# Patient Record
Sex: FEMALE | Race: WHITE | Employment: UNEMPLOYED | URBAN - METROPOLITAN AREA
[De-identification: names, ages, dates, MRNs, and addresses within clinical notes are randomized per-mention and may not be internally consistent; named-entity substitution may affect disease eponyms.]

---

## 2018-05-01 ENCOUNTER — OFFICE VISIT (OUTPATIENT)
Dept: FAMILY MEDICINE CLINIC | Age: 18
End: 2018-05-01

## 2018-05-01 ENCOUNTER — TELEPHONE (OUTPATIENT)
Dept: FAMILY MEDICINE CLINIC | Age: 18
End: 2018-05-01

## 2018-05-01 VITALS
BODY MASS INDEX: 24.62 KG/M2 | HEIGHT: 70 IN | SYSTOLIC BLOOD PRESSURE: 116 MMHG | WEIGHT: 172 LBS | RESPIRATION RATE: 17 BRPM | TEMPERATURE: 98.1 F | OXYGEN SATURATION: 98 % | DIASTOLIC BLOOD PRESSURE: 80 MMHG | HEART RATE: 76 BPM

## 2018-05-01 DIAGNOSIS — M76.892 TENDINITIS OF LEFT KNEE: ICD-10-CM

## 2018-05-01 DIAGNOSIS — M25.562 ACUTE PAIN OF LEFT KNEE: Primary | ICD-10-CM

## 2018-05-01 PROBLEM — N92.0 MENORRHAGIA: Status: ACTIVE | Noted: 2018-05-01

## 2018-05-01 PROBLEM — L70.0 ACNE VULGARIS: Status: ACTIVE | Noted: 2018-05-01

## 2018-05-01 PROBLEM — N91.5 OLIGOMENORRHEA: Status: ACTIVE | Noted: 2018-05-01

## 2018-05-01 RX ORDER — NORETHINDRONE ACETATE AND ETHINYL ESTRADIOL 1; .02 MG/1; MG/1
TABLET ORAL
COMMUNITY

## 2018-05-01 RX ORDER — CLINDAMYCIN PHOSPHATE, BENZOYL PEROXIDE 25; 10 MG/G; MG/G
GEL TOPICAL
COMMUNITY

## 2018-05-01 RX ORDER — CLINDAMYCIN AND BENZOYL PEROXIDE 10; 50 MG/G; MG/G
GEL TOPICAL
COMMUNITY
Start: 2018-02-22

## 2018-05-01 NOTE — TELEPHONE ENCOUNTER
----- Message from Penelope Christy NP sent at 5/1/2018  3:30 PM EDT -----  Please call the patient regarding her result. Nothing identified on image so will continue with referral to sports medicine for continued care.

## 2018-05-01 NOTE — PROGRESS NOTES
Gloria Squires is a 21 y.o. female (: 2000) presenting to address:Knee pain bilateral     Chief Complaint   Patient presents with    Knee Pain     Left sided; x1 year        Vitals:    18 1135   BP: 116/80   Pulse: 76   Resp: 17   Temp: 98.1 °F (36.7 °C)   TempSrc: Oral   SpO2: 98%   Weight: 172 lb (78 kg)   Height: 5' 10\" (1.778 m)   PainSc:   0 - No pain   LMP: 2018       Hearing/Vision:   No exam data present    Learning Assessment:     Learning Assessment 2018   PRIMARY LEARNER Patient   HIGHEST LEVEL OF EDUCATION - PRIMARY LEARNER  SOME COLLEGE   BARRIERS PRIMARY LEARNER NONE   CO-LEARNER CAREGIVER No   PRIMARY LANGUAGE ENGLISH   LEARNER PREFERENCE PRIMARY DEMONSTRATION   ANSWERED BY patient   RELATIONSHIP SELF     Depression Screening:     PHQ over the last two weeks 2018   Little interest or pleasure in doing things Not at all   Feeling down, depressed or hopeless Not at all   Total Score PHQ 2 0     Fall Risk Assessment:   No flowsheet data found. Abuse Screening:     Abuse Screening Questionnaire 2018   Do you ever feel afraid of your partner? N   Are you in a relationship with someone who physically or mentally threatens you? N   Is it safe for you to go home? Y     Coordination of Care Questionaire:   1. Have you been to the ER, urgent care clinic since your last visit? Hospitalized since your last visit? no    2. Have you seen or consulted any other health care providers outside of the [address] [place] [address] since your last visit? Include any pap smears or colon screening. Dr. Monae Lazo    Advanced Directive:   1. Do you have an Advanced Directive? no    2. Would you like information on Advanced Directives?  no

## 2018-05-01 NOTE — PATIENT INSTRUCTIONS
Knee Pain or Injury: Care Instructions  Your Care Instructions    Injuries are a common cause of knee problems. Sudden (acute) injuries may be caused by a direct blow to the knee. They can also be caused by abnormal twisting, bending, or falling on the knee. Pain, bruising, or swelling may be severe, and may start within minutes of the injury. Overuse is another cause of knee pain. Other causes are climbing stairs, kneeling, and other activities that use the knee. Everyday wear and tear, especially as you get older, also can cause knee pain. Rest, along with home treatment, often relieves pain and allows your knee to heal. If you have a serious knee injury, you may need tests and treatment. Follow-up care is a key part of your treatment and safety. Be sure to make and go to all appointments, and call your doctor if you are having problems. It's also a good idea to know your test results and keep a list of the medicines you take. How can you care for yourself at home? · Be safe with medicines. Read and follow all instructions on the label. ¨ If the doctor gave you a prescription medicine for pain, take it as prescribed. ¨ If you are not taking a prescription pain medicine, ask your doctor if you can take an over-the-counter medicine. · Rest and protect your knee. Take a break from any activity that may cause pain. · Put ice or a cold pack on your knee for 10 to 20 minutes at a time. Put a thin cloth between the ice and your skin. · Prop up a sore knee on a pillow when you ice it or anytime you sit or lie down for the next 3 days. Try to keep it above the level of your heart. This will help reduce swelling. · If your knee is not swollen, you can put moist heat, a heating pad, or a warm cloth on your knee. · If your doctor recommends an elastic bandage, sleeve, or other type of support for your knee, wear it as directed.   · Follow your doctor's instructions about how much weight you can put on your leg. Use a cane, crutches, or a walker as instructed. · Follow your doctor's instructions about activity during your healing process. If you can do mild exercise, slowly increase your activity. · Reach and stay at a healthy weight. Extra weight can strain the joints, especially the knees and hips, and make the pain worse. Losing even a few pounds may help. When should you call for help? Call 911 anytime you think you may need emergency care. For example, call if:  ? · You have symptoms of a blood clot in your lung (called a pulmonary embolism). These may include:  ¨ Sudden chest pain. ¨ Trouble breathing. ¨ Coughing up blood. ?Call your doctor now or seek immediate medical care if:  ? · You have severe or increasing pain. ? · Your leg or foot turns cold or changes color. ? · You cannot stand or put weight on your knee. ? · Your knee looks twisted or bent out of shape. ? · You cannot move your knee. ? · You have signs of infection, such as:  ¨ Increased pain, swelling, warmth, or redness. ¨ Red streaks leading from the knee. ¨ Pus draining from a place on your knee. ¨ A fever. ? · You have signs of a blood clot in your leg (called a deep vein thrombosis), such as:  ¨ Pain in your calf, back of the knee, thigh, or groin. ¨ Redness and swelling in your leg or groin. ? Watch closely for changes in your health, and be sure to contact your doctor if:  ? · You have tingling, weakness, or numbness in your knee. ? · You have any new symptoms, such as swelling. ? · You have bruises from a knee injury that last longer than 2 weeks. ? · You do not get better as expected. Where can you learn more? Go to http://olga-ana.info/. Enter K195 in the search box to learn more about \"Knee Pain or Injury: Care Instructions. \"  Current as of: March 20, 2017  Content Version: 11.4  © 2599-4538 [employer].  Care instructions adapted under license by [employer] (which disclaims liability or warranty for this information). If you have questions about a medical condition or this instruction, always ask your healthcare professional. [address] any warranty or liability for your use of this information.

## 2018-05-03 NOTE — TELEPHONE ENCOUNTER
Spoke with patient, verified with 2 identifiers. Advised of below. Patient verbalized understanding.

## 2018-05-07 ENCOUNTER — OFFICE VISIT (OUTPATIENT)
Dept: FAMILY MEDICINE CLINIC | Age: 18
End: 2018-05-07

## 2018-05-07 VITALS
HEIGHT: 70 IN | TEMPERATURE: 98.1 F | HEART RATE: 61 BPM | BODY MASS INDEX: 24.62 KG/M2 | DIASTOLIC BLOOD PRESSURE: 73 MMHG | RESPIRATION RATE: 17 BRPM | SYSTOLIC BLOOD PRESSURE: 114 MMHG | WEIGHT: 172 LBS | OXYGEN SATURATION: 97 %

## 2018-05-07 DIAGNOSIS — G89.29 CHRONIC PAIN OF LEFT KNEE: Primary | ICD-10-CM

## 2018-05-07 DIAGNOSIS — M25.562 CHRONIC PAIN OF BOTH KNEES: ICD-10-CM

## 2018-05-07 DIAGNOSIS — M25.551 RIGHT HIP PAIN: ICD-10-CM

## 2018-05-07 DIAGNOSIS — M25.561 CHRONIC PAIN OF BOTH KNEES: ICD-10-CM

## 2018-05-07 DIAGNOSIS — G89.29 CHRONIC PAIN OF BOTH KNEES: ICD-10-CM

## 2018-05-07 DIAGNOSIS — M25.562 CHRONIC PAIN OF LEFT KNEE: Primary | ICD-10-CM

## 2018-05-07 NOTE — MR AVS SNAPSHOT
[address] [place] [address] [address] [facility] [id number] [phone] Patient: Carissa López MRN: [id number] :2000 Visit Information Date & Time Provider Department Dept. Phone Encounter #  
 2018  1:30 PM Elsie Agudelo, 3 [facility] [phone] [id number] Upcoming Health Maintenance Date Due Hepatitis A Peds Age 1-18 (1 of 2 - Standard Series) 11/10/1999 DTaP/Tdap/Td series (1 - Tdap) 11/10/2005 HPV Age 9Y-34Y (1 of 3 - Female 3 Dose Series) 11/10/2009 Influenza Age 5 to Adult 2018 Allergies as of 2018  Review Complete On: 2018 By: Elsie Agudelo MD  
  
 Severity Noted Reaction Type Reactions Nuts [Tree Nut] High 2018    Anaphylaxis Melon  2018    Itching Current Immunizations  Never Reviewed No immunizations on file. Not reviewed this visit You Were Diagnosed With   
  
 Codes Comments Chronic pain of left knee    -  Primary ICD-10-CM: M25.562, F07.11 ICD-9-CM: 719.46, 338.29 Vitals BP Pulse Temp Resp Height(growth percentile) Weight(growth percentile) 114/73 (55 %/ 75 %)* (BP 1 Location: Right arm, BP Patient Position: Sitting) 61 98.1 °F (36.7 °C) (Oral) 17 5' 10\" (1.778 m) (99 %, Z= 2.25) 172 lb (78 kg) (93 %, Z= 1.44) LMP SpO2 BMI OB Status Smoking Status 2018 97% 24.68 kg/m2 (77 %, Z= 0.75) Having regular periods Never Smoker *BP percentiles are based on NHBPEP's 4th Report Growth percentiles are based on CDC 2-20 Years data. BMI and BSA Data Body Mass Index Body Surface Area  
 24.68 kg/m 2 1.96 m 2 Preferred Pharmacy Pharmacy Name Phone [phone] [phone] Your Updated Medication List  
  
   
This list is accurate as of 18  2:15 PM.  Always use your most recent med list.  
  
  
  
  
 * ACANYA 1.2-2.5 % Glwp Generic drug:  clindamycin-benzoyl peroxide Acanya 1.2 %-2.5 % topical gel with pump * clindamycin-benzoyl peroxide 1.2-2.5 % Glwp APPLY A PEA SIZED AMOUNT TO ENTIRE FACE IN THE MORNING AS DIRECTED * clindamycin-benzoyl peroxide 1-5 % topical gel Commonly known as:  Janet Clarice 1/20 (21) 1-20 mg-mcg Tab Generic drug:  norethindrone-ethinyl estradiol Take  by mouth.  
  
 sulfacetamide sodium 10 % topical cream  
Apply  to affected area two (2) times a day. tazorotene 0.05 % topical gel Commonly known as:  Faina Mishraon Apply  to affected area nightly. use thin film * Notice: This list has 3 medication(s) that are the same as other medications prescribed for you. Read the directions carefully, and ask your doctor or other care provider to review them with you. We Performed the Following REFERRAL TO PHYSICAL THERAPY [BSE75 Custom] Comments:  
 Please eval and treat. [employer] volleyball athlete. Referral Information Referral ID Referred By Referred To  
  
 [id number] Bernardkevin Rehman [address] [place] [address] Phone: [phone] Fax: [phone] Visits Status Start Date End Date 1 New Request 5/7/18 5/7/19 If your referral has a status of pending review or denied, additional information will be sent to support the outcome of this decision. Patient Instructions To Do: 
· Hold off on starting conditioning until your PT eval.  They will be able to direct you a bit on what to focus on, without \"making this worse\". Notes from your doctor: · Be sure to give the physical therapists some feedback! Let them know: what your short term & long term goals are. .. If exercises seems too easy or too hard. ..  If the previous PT session left you more sore than usual, etc.  It is important for them to know these things in order to tailor your rehab plan. Introducing [facility]! Diana Holly introduces [employer] patient portal. Now you can access parts of your medical record, email your doctor's office, and request medication refills online. 1. In your internet browser, go to [email or web address] 2. Click on the First Time User? Click Here link in the Sign In box. You will see the New Member Sign Up page. 3. Enter your [employer] Access Code exactly as it appears below. You will not need to use this code after youve completed the sign-up process. If you do not sign up before the expiration date, you must request a new code. · [employer] Access Code: [email or web address] Expires: 8/5/2018  2:15 PM 
 
4. Enter the last four digits of your Social Security Number (xxxx) and Date of Birth (mm/dd/yyyy) as indicated and click Submit. You will be taken to the next sign-up page. 5. Create a [employer] ID. This will be your [employer] login ID and cannot be changed, so think of one that is secure and easy to remember. 6. Create a [employer] password. You can change your password at any time. 7. Enter your Password Reset Question and Answer. This can be used at a later time if you forget your password. 8. Enter your e-mail address. You will receive e-mail notification when new information is available in [address] 9. Click Sign Up. You can now view and download portions of your medical record. 10. Click the Download Summary menu link to download a portable copy of your medical information. If you have questions, please visit the Frequently Asked Questions section of the [employer] website. Remember, [employer] is NOT to be used for urgent needs. For medical emergencies, dial 911. Now available from your iPhone and Android! Please provide this summary of care documentation to your next provider. If you have any questions after today's visit, please call [phone].

## 2018-05-07 NOTE — PROGRESS NOTES
3 [facility]  Sports Medicine Consultation Note    Sofia Shin is a 21 y.o. female (: 2000) presenting to obtain consultative services regarding:  Chief Complaint   Patient presents with    Knee Pain     Left sided x1 year        PCP: No primary care provider on file. Requesting provider: Barbara Us NP    Assessment/Plan:       ICD-10-CM ICD-9-CM   1. Chronic pain of left knee M25.562 719.46    G89.29 338.29     Reviewed radiographs with Sofia Shin. 2/2 patellofemoral pain syndrome (weak glutes & VMO, poor biomechanics, overuse). Start formal PT. Ice PRN. F/u PRN. Orders Placed This Encounter    [facility]     Referral Priority:   Routine     Referral Type:   PT/OT/ST     Referral Reason:   Specialty Services Required       Spent 25min face-to-face, >50% spent on counseling & patient education. Overdue HM items:   Ms. Truman Patton has a reminder for a \"due or due soon\" health maintenance. I have asked that she contact her primary care provider for follow-up on this health maintenance. Management plan & patient instructions discussed with Sofia Shin, who voiced understanding. Routed information to requesting provider in shared medical record. Thank you for the opportunity to participate in the care of this patient. If any questions or concerns at all, please feel free to contact me. This document may have been created with the aid of dictation software. Text may contain errors, particularly phonetic errors. Phoenix Islas MD  Internal Medicine, Family Medicine & Sports Medicine  2018, 2:01 PM    Patient Instructions (provided in AVS): To Do:  · Hold off on starting conditioning until your PT eval.  They will be able to direct you a bit on what to focus on, without \"making this worse\". Notes from your doctor:  · Be sure to give the physical therapists some feedback!   Let them know: what your short term & long term goals are. .. If exercises seems too easy or too hard. .. If the previous PT session left you more sore than usual, etc.  It is important for them to know these things in order to tailor your rehab plan. History:     I was asked to provide consultative services by Leobardo Vega NP for advice/opinion related to evaluating    Chief Complaint   Patient presents with    Knee Pain     Left sided x1 year        [employer]. 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 3yrs ago, it was right knee that was having \"issues\". Last year, now is left knee. Worse with weight bearing and twisting. Running isn't bad. Squats & box jumps can be troublesome when it is painful. No swelling.  + pseudoinstability, but no true instability.  + locking   Pain is anterior, and does not radiate. Past Medical History:   Diagnosis Date    Hives      Past Surgical History:   Procedure Laterality Date    HX TONSILLECTOMY        reports that she has never smoked. She has never used smokeless tobacco. She reports that she drinks alcohol. She reports that she does not use illicit drugs. Family History   Problem Relation Age of Onset    No Known Problems Mother     No Known Problems Father      Allergies   Allergen Reactions    Nuts [Tree Nut] Anaphylaxis    Melon Itching       Problem List:      Patient Active Problem List    Diagnosis    Menorrhagia    Oligomenorrhea    Acne vulgaris       Medications:     Current Outpatient Prescriptions on File Prior to Visit   Medication Sig Dispense Refill    clindamycin-benzoyl peroxide (ACANYA) 1.2-2.5 % glwp Acanya 1.2 %-2.5 % topical gel with pump      clindamycin-benzoyl peroxide 1.2-2.5 % glwp APPLY A PEA SIZED AMOUNT TO ENTIRE FACE IN THE MORNING AS DIRECTED      norethindrone-ethinyl estradiol (JUNEL 1/20, 21,) 1-20 mg-mcg tab Take  by mouth.  tazorotene (TAZORAC) 0.05 % topical gel Apply  to affected area nightly.  use thin film      sulfacetamide sodium 10 % topical cream Apply  to affected area two (2) times a day.  clindamycin-benzoyl peroxide (BENZACLIN) 1-5 % topical gel        No current facility-administered medications on file prior to visit. Review of Systems:     Review of Systems   Musculoskeletal: Positive for joint pain. Skin: Negative for rash. Neurological: Negative for tingling, tremors, sensory change and focal weakness. Physical Assessment:   VS:    Vitals:    05/07/18 1335   BP: 114/73   Pulse: 61   Resp: 17   Temp: 98.1 °F (36.7 °C)   TempSrc: Oral   SpO2: 97%   Weight: 172 lb (78 kg)   Height: 5' 10\" (1.778 m)   PainSc:   1   PainLoc: Knee   LMP: 04/22/2018       Physical Exam   Constitutional: She is oriented to person, place, and time. She appears well-developed and well-nourished. No distress. HENT:   Head: Normocephalic and atraumatic. Eyes: EOM are normal.   Neck: Neck supple. Pulmonary/Chest: Effort normal.   Musculoskeletal:        Right knee: She exhibits normal range of motion, no swelling and no effusion. No tenderness found. No medial joint line, no lateral joint line, no MCL, no LCL and no patellar tendon tenderness noted. Left knee: She exhibits normal range of motion, no swelling and no effusion. No tenderness found. No medial joint line, no lateral joint line, no MCL, no LCL and no patellar tendon tenderness noted. Neg lachmans bilaterally; + glute & VMO weakness L >> R; + L patellar grind for p! Neurological: She is alert and oriented to person, place, and time. Coordination normal.   Skin: Skin is warm and dry. She is not diaphoretic. Psychiatric: She has a normal mood and affect. Her behavior is normal. Thought content normal.   Nursing note reviewed. Recent Labs & Imaging:     XR Results (maximum last 3): Results from Appointment encounter on 05/01/18   XR KNEE LT MIN 4 V   Narrative Examination: Left knee 3 views.     HISTORY: Left knee pain for approximately 1 year. FINDINGS: Weightbearing AP, PA, lateral, and patellar tangential projections of  the left knee are performed and interpreted without comparison. Osseous  alignment is within normal limits. No fracture. No joint effusion. Incidental  note made of a pedunculated osteochondroma arising from the proximal medial  subarticular tibia. Impression IMPRESSION:  1. No acute osseous abnormality or malalignment involving the left knee. 2. Incidentally identified osteochondroma as above.

## 2018-05-07 NOTE — PROGRESS NOTES
Angelica Macario is a 21 y.o. female (: 2000) presenting to address:Left sided knee pain x1 year    Chief Complaint   Patient presents with    Knee Pain     Left sided x1 year        Vitals:    18 1335   BP: 114/73   Pulse: 61   Resp: 17   Temp: 98.1 °F (36.7 °C)   TempSrc: Oral   SpO2: 97%   Weight: 172 lb (78 kg)   Height: 5' 10\" (1.778 m)   PainSc:   1   PainLoc: Knee   LMP: 2018       Hearing/Vision:   No exam data present    Learning Assessment:     Learning Assessment 2018   PRIMARY LEARNER Patient   HIGHEST LEVEL OF EDUCATION - PRIMARY LEARNER  SOME COLLEGE   BARRIERS PRIMARY LEARNER NONE   CO-LEARNER CAREGIVER No   PRIMARY LANGUAGE ENGLISH   LEARNER PREFERENCE PRIMARY DEMONSTRATION   ANSWERED BY patient   RELATIONSHIP SELF     Depression Screening:     PHQ over the last two weeks 2018   Little interest or pleasure in doing things Not at all   Feeling down, depressed or hopeless Not at all   Total Score PHQ 2 0     Fall Risk Assessment:   No flowsheet data found. Abuse Screening:     Abuse Screening Questionnaire 2018   Do you ever feel afraid of your partner? N   Are you in a relationship with someone who physically or mentally threatens you? N   Is it safe for you to go home? Y     Coordination of Care Questionaire:   1. Have you been to the ER, urgent care clinic since your last visit? Hospitalized since your last visit? no    2. Have you seen or consulted any other health care providers outside of the [address] [place] [address] since your last visit? Include any pap smears or colon screening. no    Advanced Directive:   1. Do you have an Advanced Directive? no    2. Would you like information on Advanced Directives?  no

## 2018-05-07 NOTE — PATIENT INSTRUCTIONS
To Do:  · Hold off on starting conditioning until your PT eval.  They will be able to direct you a bit on what to focus on, without \"making this worse\". Notes from your doctor:  · Be sure to give the physical therapists some feedback! Let them know: what your short term & long term goals are. .. If exercises seems too easy or too hard. .. If the previous PT session left you more sore than usual, etc.  It is important for them to know these things in order to tailor your rehab plan.

## 2018-05-11 ENCOUNTER — HOSPITAL ENCOUNTER (OUTPATIENT)
Dept: PHYSICAL THERAPY | Age: 18
Discharge: HOME | End: 2018-05-11
Payer: COMMERCIAL

## 2018-05-11 PROCEDURE — 97110 THERAPEUTIC EXERCISES: CPT

## 2018-05-11 PROCEDURE — 97161 PT EVAL LOW COMPLEX 20 MIN: CPT

## 2018-05-11 NOTE — PROGRESS NOTES
[address] PHYSICAL THERAPY    [address] [place] [address], [address] [place] [address]       Phone: [phone]  Fax: [phone] / 1878 [facility]  Patient Name: Jaylen Colunga : 2000   Medical   Diagnosis: L knee pain Treatment Diagnosis: Left knee pain [M25.562]   Onset Date: Chronic     Referral Source: Angela Martin MD Start of Care [facility]): 2018   Prior Hospitalization: See medical history Provider #: [id number]   Prior Level of Function: Chronic history of difficulty with volleyball and weight lifting   Comorbidities: None   Medications: Verified on Patient Summary List   The Plan of Care and following information is based on the information from the initial evaluation.   ===========================================================================================  Assessment / key information:  Patient is a 21year old female presenting to therapy with signs and symptoms consistent with L knee pain. Patient reports her symptoms began about 1 year ago without any specific mechanism of injury. Patient states she notices her knee pain increases while participating in volleyball and occasionally with weight lifting. Patient notes the pain feels sharp when performing athletic activities. Patient has received x-rays which were unremarkable. Patient reports increased difficulty with volleyball and weight lfiting. Patient notes symptoms feel better with rest. Patient rates pain at worst 8/10 and 0/10 at best.   Objective Data: Inspection-No significant gait deviations noted. Strength Testing: Hip  ext R 4/5, L 4/5; abd R 4/5, L 4/5; Knee Flex R 5/5, L 5/5; ext R 5/5, L 4/5. SL glute bridge isometric: poor on the L able to maintain for 5 seconds before loss of posture, fair on the R able to maintain for 10-15 seconds.  Body weight squat: excessive lumbar lordosis, increased anterior weight shift with slight heel rise. SL squat: poor bilaterally with increased knee valgus with increased depth. Poor landing mechanics with 12 inch box jump, excessive knee valgus angulation noted. Flexibility Testing: moderate restriction to L quadriceps. Tenderness to distal L ITB and VL. FOTO: 57/100. Patient educated on diagnosis, prognosis, POC and HEP. Patient issued copy of HEP and denied additional questions. Patient will benefit from skilled PT in order to address these impairments and functional limitations.   ===========================================================================================  Eval Complexity: History LOW Complexity : Zero comorbidities / personal factors that will impact the outcome / POC;  Examination  HIGH Complexity : 4+ Standardized tests and measures addressing body structure, function, activity limitation and / or participation in recreation ; Presentation MEDIUM Complexity : Evolving with changing characteristics ; Decision Making MEDIUM Complexity : FOTO score of 26-74; Overall Complexity LOW   Problem List: pain affecting function, decrease ROM, decrease strength, decrease ADL/ functional abilitiies, decrease activity tolerance and decrease flexibility/ joint mobility   Treatment Plan may include any combination of the following: Therapeutic exercise, Therapeutic activities, Neuromuscular re-education, Physical agent/modality, Manual therapy, Patient education and Functional mobility training  Patient / Family readiness to learn indicated by: asking questions, trying to perform skills and interest  Persons(s) to be included in education: patient (P)  Barriers to Learning/Limitations: no  Measures taken:    Patient Goal (s): Reduce pain, improve strength   Patient self reported health status: good  Rehabilitation Potential: good   Short Term Goals: To be accomplished in  3  weeks:  1. Patient will demonstrate independence with HEP for self management of symptoms.   2. Patient will demonstrate ability to perform SL glute bridge iso for 25 seconds bilaterally with proper mechanics in order to contribute to improved landing mechanics.  Long Term Goals: To be accomplished in  6  weeks:  1. Patient will improve FOTO to >/= 73/100 in order to improve quality of life. 2. Patient will demonstrate ability to perform SL drop step from 12 inch box with proper mechanics in order to improve tolerance to volleyball activities. 3. Patient will report reduction in pain at worst to 0-1/10 in order to improve tolerance to weight lifting activities. Frequency / Duration:   Patient to be seen  2  times per week for 6  weeks:  Patient / Caregiver education and instruction: self care, activity modification and exercises  G-Codes (GP): opal  Therapist Signature: Jose Mixon PT Date: 6/43/3343   Certification Period: na Time: 9:11 AM   ===========================================================================================  I certify that the above Physical Therapy Services are being furnished while the patient is under my care. I agree with the treatment plan and certify that this therapy is necessary. Physician Signature:        Date:       Time:     Please sign and return to [facility] or you may fax the signed copy to [phone]. Thank you.

## 2018-05-11 NOTE — PROGRESS NOTES
PHYSICAL THERAPY - DAILY TREATMENT NOTE    Patient Name: Kanwal Thurman        Date: 2018  : 2000   YES Patient  Verified  Visit #:     Insurance: Payor: Reyes Daunt / Plan: [address] [facility] / Product Type: Commerical /      In time: 830 Out time: 910   Total Treatment Time: 40     Medicare Time Tracking (below)   Total Timed Codes (min):  na 1:1 Treatment Time:  na     TREATMENT AREA =  Left knee pain [M25.562]    SUBJECTIVE  Pain Level (on 0 to 10 scale):  2   10   Medication Changes/New allergies or changes in medical history, any new surgeries or procedures? NO    If yes, update Summary List   Subjective Functional Status/Changes:  []  No changes reported     See POC          OBJECTIVE      10 min Therapeutic Exercise:  [x]  See flow sheet   Rationale:      increase strength, improve coordination and increase proprioception to improve the patients ability to perform athletic activities. min Patient Education:  YES  Reviewed HEP   []  Progressed/Changed HEP based on: Other Objective/Functional Measures:    See POC     Post Treatment Pain Level (on 0 to 10) scale:   2   10     ASSESSMENT  Assessment/Changes in Function:     See POC     []  See Progress Note/Recertification   Patient will continue to benefit from skilled PT services to modify and progress therapeutic interventions, address functional mobility deficits, address ROM deficits, address strength deficits, analyze and address soft tissue restrictions, analyze and cue movement patterns, analyze and modify body mechanics/ergonomics and assess and modify postural abnormalities to attain remaining goals.    Progress toward goals / Updated goals:    See POC     PLAN  [x]  Upgrade activities as tolerated YES Continue plan of care   []  Discharge due to :    []  Other:      Therapist: Kristnia Donnelly PT    Date: 2018 Time: 9:24 AM     Future Appointments  Date Time Provider Francisco Can 5/14/2018 9:00 AM Rashad Ibrahim, PT [facility]   5/18/2018 7:00 AM Rashad Ibrahim, PT [facility]   5/22/2018 9:00 AM Rashad Ibrahim [facility]

## 2018-05-14 ENCOUNTER — HOSPITAL ENCOUNTER (OUTPATIENT)
Dept: PHYSICAL THERAPY | Age: 18
Discharge: HOME | End: 2018-05-14
Payer: COMMERCIAL

## 2018-05-14 PROCEDURE — 97110 THERAPEUTIC EXERCISES: CPT

## 2018-05-14 PROCEDURE — 97140 MANUAL THERAPY 1/> REGIONS: CPT

## 2018-05-14 NOTE — PROGRESS NOTES
PHYSICAL THERAPY - DAILY TREATMENT NOTE    Patient Name: Nick Rushing        Date: 2018  : 2000   YES Patient  Verified  Visit #:      of     Insurance: Payor: Sameciriloa Oats / Plan: [address] PT / Product Type: Commerical /      In time: 778 Out time: 956   Total Treatment Time: 58     Medicare Time Tracking (below)   Total Timed Codes (min):  na 1:1 Treatment Time:  na     TREATMENT AREA =  Left knee pain [M25.562]    SUBJECTIVE  Pain Level (on 0 to 10 scale):  2  / 10   Medication Changes/New allergies or changes in medical history, any new surgeries or procedures? NO    If yes, update Summary List   Subjective Functional Status/Changes:  []  No changes reported     Patient reports compliance with her HEP and noted some soreness \"trying to keep my knee straight. \" Patient denies complications since her IE.          OBJECTIVE  Modalities Rationale:     decrease inflammation and decrease pain to improve patient's ability to perform transfers. min [] Estim, type/location:                                      []  att     []  unatt     []  w/US     []  w/ice    []  w/heat    min []  Mechanical Traction: type/lbs                   []  pro   []  sup   []  int   []  cont    []  before manual    []  after manual    min []  Ultrasound, settings/location:      min []  Iontophoresis w/ dexamethasone, location:                                               []  take home patch       []  in clinic   10 min [x]  Ice     []  Heat    location/position: To  L knee in long sit    min []  Vasopneumatic Device, press/temp:     min []  Other:    [x] Skin assessment post-treatment (if applicable):    [x]  intact    []  redness- no adverse reaction     []redness  adverse reaction:        38 min Therapeutic Exercise:  [x]  See flow sheet   Rationale:      increase ROM, increase strength, improve coordination and increase proprioception to improve the patients ability to perform athletic activities. 10 min Manual Therapy: STM to L distal VL and L ITB   Rationale:      decrease pain, increase ROM, increase tissue extensibility and decrease trigger points to improve patient's ability to perform walking activities. min Patient Education:  YES  Reviewed HEP   []  Progressed/Changed HEP based on: Other Objective/Functional Measures:    Progressed patient program per flowsheet in order to improve B hip and knee strength and stability  Patient cued on proper weight shifting for both goblet squat and barbell RDL in order to promote proper movement patterns and muscle activation  Cued during SL drop step and SL squat to table to reduce knee valgus angulation     Post Treatment Pain Level (on 0 to 10) scale:   3  / 10     ASSESSMENT  Assessment/Changes in Function:     Patient tolerated session well, noting minor increase in soreness post session. Patient educated to continue with HEP as prescribed at this time. []  See Progress Note/Recertification   Patient will continue to benefit from skilled PT services to modify and progress therapeutic interventions, address functional mobility deficits, address ROM deficits, address strength deficits, analyze and address soft tissue restrictions, analyze and cue movement patterns, analyze and modify body mechanics/ergonomics and assess and modify postural abnormalities to attain remaining goals.    Progress toward goals / Updated goals:    Progressing with STG#1     PLAN  [x]  Upgrade activities as tolerated YES Continue plan of care   []  Discharge due to :    []  Other:      Therapist: Regina Paige PT    Date: 5/14/2018 Time: 10:49 AM     Future Appointments  Date Time Provider Francisco Can   5/18/2018 7:00 AM Regina Paige PT [facility]   5/22/2018 9:00 AM Regina Paige PT [facility]

## 2018-05-18 ENCOUNTER — HOSPITAL ENCOUNTER (OUTPATIENT)
Dept: PHYSICAL THERAPY | Age: 18
Discharge: HOME | End: 2018-05-18
Payer: COMMERCIAL

## 2018-05-18 PROCEDURE — 97110 THERAPEUTIC EXERCISES: CPT

## 2018-05-18 PROCEDURE — 97140 MANUAL THERAPY 1/> REGIONS: CPT

## 2018-05-18 NOTE — PROGRESS NOTES
PHYSICAL THERAPY - DAILY TREATMENT NOTE    Patient Name: Agustin Boggs        Date: 2018  : 2000   YES Patient  Verified  Visit #:   3   of   12  Insurance: Payor: Loulou Jay / Plan:  [address] / Product Type: Commerical /      In time: 658 Out time: 757   Total Treatment Time: 61     Medicare Time Tracking (below)   Total Timed Codes (min):  na 1:1 Treatment Time:  na     TREATMENT AREA =  Left knee pain [M25.562]    SUBJECTIVE  Pain Level (on 0 to 10 scale):  3  / 10   Medication Changes/New allergies or changes in medical history, any new surgeries or procedures? NO    If yes, update Summary List   Subjective Functional Status/Changes:  []  No changes reported     Patient states she \"felt like I worked it\" after her last session. Patient denies complications since her LV. OBJECTIVE  Modalities Rationale:     decrease inflammation and decrease pain to improve patient's ability to perform recreational activities. min [] Estim, type/location:                                      []  att     []  unatt     []  w/US     []  w/ice    []  w/heat    min []  Mechanical Traction: type/lbs                   []  pro   []  sup   []  int   []  cont    []  before manual    []  after manual    min []  Ultrasound, settings/location:      min []  Iontophoresis w/ dexamethasone, location:                                               []  take home patch       []  in clinic   10 min [x]  Ice     []  Heat    location/position: To L knee in long sit with bolster    min []  Vasopneumatic Device, press/temp:     min []  Other:    [x] Skin assessment post-treatment (if applicable):    [x]  intact    []  redness- no adverse reaction     []redness  adverse reaction:         37 min Therapeutic Exercise:  [x]  See flow sheet   Rationale:      increase ROM, increase strength, improve coordination and increase proprioception to improve the patients ability to perform walking activities.       12 min Manual Therapy: STM to distal L VL, L ITB   Rationale:      decrease pain, increase ROM, increase tissue extensibility and decrease trigger points to improve patient's ability to perform standing activities. min Patient Education:  YES  Reviewed HEP   []  Progressed/Changed HEP based on: Other Objective/Functional Measures: Added small plate under heel for SL squat to table in order to improve mechanics and reduce joint forces to anterior knee joint  Progressed RDL to 95# this session, patient completed with good form. Post Treatment Pain Level (on 0 to 10) scale:   3  / 10     ASSESSMENT  Assessment/Changes in Function:     Patient denied changes in symptoms post session. Patient progressing appropriately with strengthening program at this time. []  See Progress Note/Recertification   Patient will continue to benefit from skilled PT services to modify and progress therapeutic interventions, address functional mobility deficits, address ROM deficits, address strength deficits, analyze and address soft tissue restrictions, analyze and cue movement patterns, analyze and modify body mechanics/ergonomics and assess and modify postural abnormalities to attain remaining goals.    Progress toward goals / Updated goals:    Progressing with STG#2 per performance in session     PLAN  [x]  Upgrade activities as tolerated YES Continue plan of care   []  Discharge due to :    []  Other:      Therapist: Amarilis James PT    Date: 5/18/2018 Time: 7:52 AM     Future Appointments  Date Time Provider Francisco Can   5/22/2018 9:00 AM Amarilis James PT [facility]

## 2018-05-22 ENCOUNTER — HOSPITAL ENCOUNTER (OUTPATIENT)
Dept: PHYSICAL THERAPY | Age: 18
Discharge: HOME | End: 2018-05-22
Payer: COMMERCIAL

## 2018-05-22 PROCEDURE — 97140 MANUAL THERAPY 1/> REGIONS: CPT

## 2018-05-22 PROCEDURE — 97110 THERAPEUTIC EXERCISES: CPT

## 2018-05-22 NOTE — PROGRESS NOTES
PHYSICAL THERAPY - DAILY TREATMENT NOTE    Patient Name: Brenton Morgan        Date: 2018  : 2000   YES Patient  Verified  Visit #:     Insurance: Payor: Teresita Donaldson / [address]  [address] [facility] / Product Type: Commerical /      In time: 316 Out time: 686   Total Treatment Time: 58     Medicare Time Tracking (below)   Total Timed Codes (min):  na 1:1 Treatment Time:  na     TREATMENT AREA =  Left knee pain [M25.562]    SUBJECTIVE  Pain Level (on 0 to 10 scale):  3  / 10   Medication Changes/New allergies or changes in medical history, any new surgeries or procedures? NO    If yes, update Summary List   Subjective Functional Status/Changes:  []  No changes reported     Patient reports reduced soreness after her last session. Patient did play volleyball yesterday and noticed her knee was sore afterwards. OBJECTIVE  Modalities Rationale:     decrease inflammation and decrease pain to improve patient's ability to perform standing activities.     min [] Estim, type/location:                                      []  att     []  unatt     []  w/US     []  w/ice    []  w/heat    min []  Mechanical Traction: type/lbs                   []  pro   []  sup   []  int   []  cont    []  before manual    []  after manual    min []  Ultrasound, settings/location:      min []  Iontophoresis w/ dexamethasone, location:                                               []  take home patch       []  in clinic   10 min [x]  Ice     []  Heat    location/position: To L knee in long sit with bolster    min []  Vasopneumatic Device, press/temp:     min []  Other:    [x] Skin assessment post-treatment (if applicable):    [x]  intact    []  redness- no adverse reaction     []redness  adverse reaction:        38 min Therapeutic Exercise:  [x]  See flow sheet   Rationale:      increase ROM, increase strength, improve coordination and improve balance to improve the patients ability to perform recreational activities. 10 min Manual Therapy: STM to L VL, L distal ITB; Prone quad stretch on the L   Rationale:      decrease pain, increase ROM, increase tissue extensibility and decrease trigger points to improve patient's ability to perform athletic activities. min Patient Education:  YES  Reviewed HEP   []  Progressed/Changed HEP based on: Other Objective/Functional Measures:    Progressed goblet squat to be performed with 25#, performed with good technique and no increase in pain  Regressed SL squat to lateral tap down from 2 inch block, patient completed with significant reduction in pain     Post Treatment Pain Level (on 0 to 10) scale:   4 / 10     ASSESSMENT  Assessment/Changes in Function:     Patient provided updated copy of exercise program as well as green and blue theraband for independent use as she will be out of town for over 1 week. Plan to re-assess and updated program as appropriate at [place].     []  See Progress Note/Recertification   Patient will continue to benefit from skilled PT services to modify and progress therapeutic interventions, address functional mobility deficits, address ROM deficits, address strength deficits, analyze and address soft tissue restrictions, analyze and cue movement patterns, analyze and modify body mechanics/ergonomics and assess and modify postural abnormalities to attain remaining goals.    Progress toward goals / Updated goals:    Progressing with STG#2 per performance in today's session     PLAN  []  Upgrade activities as tolerated YES Continue plan of care   []  Discharge due to :    []  Other:      Therapist: Kristina Donnelly PT    Date: 5/22/2018 Time: 10:02 AM     Future Appointments  Date Time Provider Francisco Can   5/31/2018 10:00 AM Kristina Donnelly PT [facility]

## 2018-05-31 ENCOUNTER — HOSPITAL ENCOUNTER (OUTPATIENT)
Dept: PHYSICAL THERAPY | Age: 18
Discharge: HOME | End: 2018-05-31
Payer: COMMERCIAL

## 2018-05-31 PROCEDURE — 97110 THERAPEUTIC EXERCISES: CPT

## 2018-05-31 PROCEDURE — 97140 MANUAL THERAPY 1/> REGIONS: CPT

## 2018-05-31 NOTE — PROGRESS NOTES
PHYSICAL THERAPY - DAILY TREATMENT NOTE    Patient Name: Nick Rushing        Date: 2018  : 2000   YES Patient  Verified  Visit #:     of   12  Insurance: Payor: Samella Oats / Plan: [address] / Product Type: Commerical /      In time: 593 Out time: 1054   Total Treatment Time: 63     Medicare Time Tracking (below)   Total Timed Codes (min):  na 1:1 Treatment Time:  na     TREATMENT AREA =  Left knee pain [M25.562]    SUBJECTIVE  Pain Level (on 0 to 10 scale):  4  / 10   Medication Changes/New allergies or changes in medical history, any new surgeries or procedures? NO    If yes, update Summary List   Subjective Functional Status/Changes:  []  No changes reported     Patient reports a 40% improvement in symptoms since the start of therapy. Patient states her pain at worst has been 5/10 and on average 3/10. Patient continues to have difficulty with running and volleyball activities. OBJECTIVE  Modalities Rationale:     decrease inflammation and decrease pain to improve patient's ability to perform walking activities.     min [] Estim, type/location:                                      []  att     []  unatt     []  w/US     []  w/ice    []  w/heat    min []  Mechanical Traction: type/lbs                   []  pro   []  sup   []  int   []  cont    []  before manual    []  after manual    min []  Ultrasound, settings/location:      min []  Iontophoresis w/ dexamethasone, location:                                               []  take home patch       []  in clinic   10 min [x]  Ice     []  Heat    location/position: To L knee in long sit    min []  Vasopneumatic Device, press/temp:     min []  Other:    [x] Skin assessment post-treatment (if applicable):    [x]  intact    []  redness- no adverse reaction     []redness  adverse reaction:         43 min Therapeutic Exercise:  [x]  See flow sheet   Rationale:      increase ROM, increase strength, improve coordination and improve balance to improve the patients ability to perform athletic activities. 10 min Manual Therapy: STM to L VL, L glute med   Rationale:      decrease pain, increase ROM, increase tissue extensibility and decrease trigger points to improve patient's ability to perform standing activities. min Patient Education:  YES  Reviewed HEP   []  Progressed/Changed HEP based on: Other Objective/Functional Measures:    See Pn     Post Treatment Pain Level (on 0 to 10) scale:   4 / 10     ASSESSMENT  Assessment/Changes in Function:     See Pn     []  See Progress Note/Recertification   Patient will continue to benefit from skilled PT services to modify and progress therapeutic interventions, address functional mobility deficits, address ROM deficits, address strength deficits, analyze and address soft tissue restrictions, analyze and cue movement patterns, analyze and modify body mechanics/ergonomics and assess and modify postural abnormalities to attain remaining goals. Progress toward goals / Updated goals:    See PN     PLAN  [x]  Upgrade activities as tolerated YES Continue plan of care   []  Discharge due to :    []  Other:      Therapist: Smooth Scott PT    Date: 5/31/2018 Time: 11:47 AM     No future appointments.

## 2018-05-31 NOTE — PROGRESS NOTES
Griselda Tomlin [address]  [facility] THERAPY  [address], [address] [place] [address] - Phone: [phone]  Fax: [phone]  PROGRESS NOTE  Patient Name: Efrem Rashid : 2000   Treatment/Medical Diagnosis: Left knee pain [M25.562]   Referral Source: Mayte Rios MD     Date of Initial Visit: 18 Attended Visits: 5 Missed Visits: 0     SUMMARY OF TREATMENT  Patient has attended 4 follow up visits after her initial evaluation on 18. Patient has received therapeutic exercise, manual therapy and modalities in order to improve L knee strength, stability, soft tissue extensibility and pain reduction. CURRENT STATUS  Patient reports a 40% improvement in symptoms since the start of therapy. Patient states her knee feels stronger and more stable and she is more aware of her knee positioning during dynamic activities. Patient does continue to experience knee pain during running and volleyball activities at this time, but her tolerance to strengthening exercises is improving. Patient is able to perform a SL drop step from 6 inch box with proper landing mechanics, however demonstrates excessive knee valgus on 12 inch box. Patient's squatting mechanics have improved since her IE and is able to perform a lateral tap down from a 2 inch block with proper mechanics. Patient rates her pain at worst as a 5/10 and on average 3/10. Patient notes no change in her FOTO score to 34/100 and a +3 on the global rate of change (GROC) scale indicating she is somewhat better. Patient will be out of town for the next month and was provided an updated copy of her exercise program as well as appropriate bands for independent use. Plan to resume PT once she returns to town at the end of the month. Goal/Measure of Progress Goal Met? 1. Patient will improve FOTO to >/= 73/100 in order to improve quality of life   Status at last Eval: 34/100 Current Status: 34/100 no   2.   Patient will demonstrate ability to perform SL drop step from 12 inch box with proper mechanics in order to improve tolerance to volleyball activities   Status at last Eval: n/a Current Status: See above progressing   3. Patient will report reduction in pain at worst to 0-1/10 in order to improve tolerance to weight lifting activities   Status at last Eval: 8/10 Current Status: 5/10 progressing     New Goals to be achieved in __4__  weeks:  1. Continue with LTG#1  2. Continue with LTG#2  3. Continue with LTG#3  RECOMMENDATIONS  Patient will be out of town until the end of June, plan to continue with PT once she returns. Patient provided copy of exercise program and instructed on independent performance. If you have any questions/comments please contact us directly at [phone]. Thank you for allowing us to assist in the care of your patient. Therapist Signature: Zahraa Mckay PT Date: 5/31/2018     Time: 11:01 AM   NOTE TO PHYSICIAN:  PLEASE COMPLETE THE ORDERS BELOW AND FAX TO   [facility] Physical Therapy: [phone]  If you are unable to process this request in 24 hours please contact our office: [phone]    ___ I have read the above report and request that my patient continue as recommended.   ___ I have read the above report and request that my patient continue therapy with the following changes/special instructions:_________________________________________________________   ___ I have read the above report and request that my patient be discharged from therapy.      Physician Signature:        Date:       Time:

## 2018-07-05 NOTE — PROGRESS NOTES
[address] [facility] THERAP[facility]   [facility], [address] [facility], [address] [place] [address] - Phone: [phone]  Fax: [phone]  DISCHARGE SUMMARY  Patient Name: Jose Pereira : 2000   Treatment/Medical Diagnosis: Left knee pain [M25.562]   Referral Source: José Rodriguez MD     Date of Initial Visit: 18 Attended Visits: 5 Missed Visits: 0     SUMMARY OF TREATMENT  Patient has attended 4 follow up visits after her initial evaluation on 18. Patient has received therapeutic exercise, manual therapy and modalities in order to improve L knee strength, stability, soft tissue extensibility and pain reduction. CURRENT STATUS  Patient has not attended a PT appointment since her last visit on 18. Patient had been instructed on strengthening program and provided appropriate information for further guidance. Patient has likely returned to college and will not return to PT for future visits. Thank you for this referral.   Chiquita Mahmood to schoolOther: Return to school    If you have any questions/comments please contact us directly at [phone]. Thank you for allowing us to assist in the care of your patient. Therapist Signature:  Chema Dominguez PT Date: 18     Time: 8:41 AM

## 2018-07-30 ENCOUNTER — OFFICE VISIT (OUTPATIENT)
Dept: FAMILY MEDICINE CLINIC | Age: 18
End: 2018-07-30

## 2018-07-30 VITALS
BODY MASS INDEX: 24.2 KG/M2 | SYSTOLIC BLOOD PRESSURE: 107 MMHG | HEIGHT: 70 IN | DIASTOLIC BLOOD PRESSURE: 70 MMHG | WEIGHT: 169 LBS | RESPIRATION RATE: 18 BRPM | TEMPERATURE: 97.8 F | HEART RATE: 94 BPM | OXYGEN SATURATION: 97 %

## 2018-07-30 DIAGNOSIS — M25.562 CHRONIC PAIN OF LEFT KNEE: Primary | ICD-10-CM

## 2018-07-30 DIAGNOSIS — G89.29 CHRONIC PAIN OF LEFT KNEE: Primary | ICD-10-CM

## 2018-07-30 NOTE — PROGRESS NOTES
Christy Kent is a 21 y.o. female (: 2000) presenting to address:bilateral knee pain x2 weeks    Chief Complaint   Patient presents with    Knee Pain     bilateral x2 weeks        Vitals:    18 1406   BP: 107/70   Pulse: 94   Resp: 18   Temp: 97.8 °F (36.6 °C)   TempSrc: Oral   SpO2: 97%   Weight: 169 lb (76.7 kg)   Height: 5' 10\" (1.778 m)   PainSc:   5   PainLoc: Knee   LMP: 2018       Hearing/Vision:   No exam data present    Learning Assessment:     Learning Assessment 2018   PRIMARY LEARNER Patient   HIGHEST LEVEL OF EDUCATION - PRIMARY LEARNER  SOME COLLEGE   BARRIERS PRIMARY LEARNER NONE   CO-LEARNER CAREGIVER No   PRIMARY LANGUAGE ENGLISH   LEARNER PREFERENCE PRIMARY DEMONSTRATION   ANSWERED BY patient   RELATIONSHIP SELF     Depression Screening:     PHQ over the last two weeks 2018   Little interest or pleasure in doing things Not at all   Feeling down, depressed, irritable, or hopeless Not at all   Total Score PHQ 2 0     Fall Risk Assessment:   No flowsheet data found. Abuse Screening:     Abuse Screening Questionnaire 2018   Do you ever feel afraid of your partner? N   Are you in a relationship with someone who physically or mentally threatens you? N   Is it safe for you to go home? Y     Coordination of Care Questionaire:   1. Have you been to the ER, urgent care clinic since your last visit? Hospitalized since your last visit? no    2. Have you seen or consulted any other health care providers outside of the [address] [place] [address] since your last visit? Include any pap smears or colon screening. no    Advanced Directive:   1. Do you have an Advanced Directive? no    2. Would you like information on Advanced Directives?  no

## 2018-07-30 NOTE — PROGRESS NOTES
Nehal Vega is a 21 y.o.  female and presents with    Chief Complaint   Patient presents with    Knee Pain     bilateral x2 weeks          Subjective:  Patient presents for continued knee pain left side. She was seen by sports med and advised to do PT but she only went 5 times and stopped to go to a training camp. She presents today with same complaint no change in level of pain or function of knee. Current Outpatient Prescriptions   Medication Sig Dispense Refill    clindamycin-benzoyl peroxide (ACANYA) 1.2-2.5 % glwp Acanya 1.2 %-2.5 % topical gel with pump      clindamycin-benzoyl peroxide 1.2-2.5 % glwp APPLY A PEA SIZED AMOUNT TO ENTIRE FACE IN THE MORNING AS DIRECTED      norethindrone-ethinyl estradiol (JUNEL 1/20, 21,) 1-20 mg-mcg tab Take  by mouth.  tazorotene (TAZORAC) 0.05 % topical gel Apply  to affected area nightly. use thin film      sulfacetamide sodium 10 % topical cream Apply  to affected area two (2) times a day.  clindamycin-benzoyl peroxide (BENZACLIN) 1-5 % topical gel        Allergies   Allergen Reactions    Nuts [Tree Nut] Anaphylaxis    Melon Itching       Review of Systems   Musculoskeletal: Positive for joint pain. All other systems reviewed and are negative. Objective:  Vitals:    07/30/18 1406   BP: 107/70   Pulse: 94   Resp: 18   Temp: 97.8 °F (36.6 °C)   TempSrc: Oral   SpO2: 97%   Weight: 169 lb (76.7 kg)   Height: 5' 10\" (1.778 m)   PainSc:   5   PainLoc: Knee   LMP: 07/22/2018     General:   Well-groomed, well-nourished, in no distress, pleasant, alert, appropriate    Cardiovasc:   No JVD. RRR,  no murmur, rubs or gallops. Pulmonary:    Lungs clear bilaterally, no wheezing, rales or rhonchi. MSK:   Normal full range of motion of joints, 5/5 muscle strength throughout. Bilateral knees: She   exhibits normal range of motion, no swelling and no effusion. No tenderness found. Assessment/Plan:      1.  Chronic pain of left knee  Should return to PT but with school starting she is hesistant, She will start using pain reliever as needed before games, and ice area, continue HEP from PT and when she is ready follow up with Sports med. I have discussed the diagnosis with the patient and the intended plan as seen in the above orders. The patient has received an after-visit summary and questions were answered concerning future plans. I have discussed medication side effects and warnings with the patient as well. I have reviewed the plan of care with the patient, accepted their input and they are in agreement with the treatment goals. Follow-up Disposition:  Return if symptoms worsen or fail to improve. More than 1/2 of this 30 minute visit was spent in counselling and coordination of care, as described above.     Carmella CAMACHOP-BC

## 2018-07-30 NOTE — PATIENT INSTRUCTIONS
Knee Pain or Injury: Care Instructions  Your Care Instructions    Injuries are a common cause of knee problems. Sudden (acute) injuries may be caused by a direct blow to the knee. They can also be caused by abnormal twisting, bending, or falling on the knee. Pain, bruising, or swelling may be severe, and may start within minutes of the injury. Overuse is another cause of knee pain. Other causes are climbing stairs, kneeling, and other activities that use the knee. Everyday wear and tear, especially as you get older, also can cause knee pain. Rest, along with home treatment, often relieves pain and allows your knee to heal. If you have a serious knee injury, you may need tests and treatment. Follow-up care is a key part of your treatment and safety. Be sure to make and go to all appointments, and call your doctor if you are having problems. It's also a good idea to know your test results and keep a list of the medicines you take. How can you care for yourself at home? · Be safe with medicines. Read and follow all instructions on the label. ¨ If the doctor gave you a prescription medicine for pain, take it as prescribed. ¨ If you are not taking a prescription pain medicine, ask your doctor if you can take an over-the-counter medicine. · Rest and protect your knee. Take a break from any activity that may cause pain. · Put ice or a cold pack on your knee for 10 to 20 minutes at a time. Put a thin cloth between the ice and your skin. · Prop up a sore knee on a pillow when you ice it or anytime you sit or lie down for the next 3 days. Try to keep it above the level of your heart. This will help reduce swelling. · If your knee is not swollen, you can put moist heat, a heating pad, or a warm cloth on your knee. · If your doctor recommends an elastic bandage, sleeve, or other type of support for your knee, wear it as directed.   · Follow your doctor's instructions about how much weight you can put on your leg. Use a cane, crutches, or a walker as instructed. · Follow your doctor's instructions about activity during your healing process. If you can do mild exercise, slowly increase your activity. · Reach and stay at a healthy weight. Extra weight can strain the joints, especially the knees and hips, and make the pain worse. Losing even a few pounds may help. When should you call for help? Call 911 anytime you think you may need emergency care. For example, call if:    · You have symptoms of a blood clot in your lung (called a pulmonary embolism). These may include:  ¨ Sudden chest pain. ¨ Trouble breathing. ¨ Coughing up blood.    Call your doctor now or seek immediate medical care if:    · You have severe or increasing pain.     · Your leg or foot turns cold or changes color.     · You cannot stand or put weight on your knee.     · Your knee looks twisted or bent out of shape.     · You cannot move your knee.     · You have signs of infection, such as:  ¨ Increased pain, swelling, warmth, or redness. ¨ Red streaks leading from the knee. ¨ Pus draining from a place on your knee. ¨ A fever.     · You have signs of a blood clot in your leg (called a deep vein thrombosis), such as:  ¨ Pain in your calf, back of the knee, thigh, or groin. ¨ Redness and swelling in your leg or groin.    Watch closely for changes in your health, and be sure to contact your doctor if:    · You have tingling, weakness, or numbness in your knee.     · You have any new symptoms, such as swelling.     · You have bruises from a knee injury that last longer than 2 weeks.     · You do not get better as expected. Where can you learn more? Go to http://olga-ana.info/. Enter K195 in the search box to learn more about \"Knee Pain or Injury: Care Instructions. \"  Current as of: November 20, 2017  Content Version: 11.7  © 7943-7217 [employer].  Care instructions adapted under license by [employer] (which disclaims liability or warranty for this information). If you have questions about a medical condition or this instruction, always ask your healthcare professional. [address] any warranty or liability for your use of this information.

## 2018-08-06 ENCOUNTER — TELEPHONE (OUTPATIENT)
Dept: FAMILY MEDICINE CLINIC | Age: 18
End: 2018-08-06

## 2018-08-06 ENCOUNTER — HOSPITAL ENCOUNTER (OUTPATIENT)
Dept: PHYSICAL THERAPY | Age: 18
Discharge: HOME | End: 2018-08-06
Payer: COMMERCIAL

## 2018-08-06 PROCEDURE — 97162 PT EVAL MOD COMPLEX 30 MIN: CPT

## 2018-08-06 NOTE — TELEPHONE ENCOUNTER
Currently discussing case with Cliff Van, PT at [facility]. Will order PT accordingly, depending on our discussion.

## 2018-08-06 NOTE — TELEPHONE ENCOUNTER
Pt would like an order to continue physical therapy at [place] [address] regarding left knee pain, please advise.

## 2018-08-06 NOTE — PROGRESS NOTES
[address] PHYSICAL THERAPY    [address] [place] [address] 51, [address],[facility], 70 [facility]       Phone: [phone]  Fax: [phone] / 18[facility]  Patient Name: Hunter Elizabeth : 2000   Medical   Diagnosis: R hip and L knee pain Treatment Diagnosis: Bilateral hip pain [M25.551, M25.552]  Bilateral knee pain [M25.561, M25.562]   Onset Date: Chronic     Referral Source: Kendal Lacey NP Start of Care [facility]): 2018   Prior Hospitalization: See medical history Provider #: [id number]   Prior Level of Function: Chronic history of difficulty with exercising and volleyball activities   Comorbidities: none   Medications: Verified on Patient Summary List   The Plan of Care and following information is based on the information from the initial evaluation.   ===========================================================================================  Assessment / key information:  Patient is a 21year old female presenting to therapy with signs and symptoms consistent with R hip and L knee pain. Patient was previously seen by our office for B knee pain related to playing volleyball. 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 Patient then participated in volleyball activities and began to experience R hip pain. Patient notes these symptoms are not constant, but worse with deep squatting and going up stairs. Patient states she starts school in 9 days and will be starting preseason volleyball immediately. Patient reports increased difficulty with exercise and volleyball activities, ascending stairs. Patient notes symptoms feel better with rest. Patient rates pain at worst 9/10 and 1/10 at best.   Objective Data:  Inspection-Patient performs body weight squat through 3/4 ROM, increased anterior tibial translation bilaterally, out toeing bilaterally, excessive forward trunk posture. SL Squat: performs through 1/4 ROM due to increased pain, excessive knee valgus angulation bilaterally, increased hip adduction bilaterally. B knee and hip AROM WNL. Strength Testing: reduced hip strength noted in all planes bilaterally. (+) McMurrays click on the L, (+) CHAYO, impingement and click with flexion to extension on the R hip, (-) hip scour on the R hip. Tenderness noted to L lateral knee joint line, R glute med and TFL. FOTO: 47/100. Based upon patient subjective information and objective testing, she may benefit from advanced imaging of R hip in order to rule out possible labral pathology. Patient educated on diagnosis, prognosis, POC and HEP. Patient issued copy of HEP and denied additional questions. Patient will benefit from skilled PT in order to address these impairments and functional limitations.   ===========================================================================================  Eval Complexity: History LOW Complexity : Zero comorbidities / personal factors that will impact the outcome / POC;  Examination  HIGH Complexity : 4+ Standardized tests and measures addressing body structure, function, activity limitation and / or participation in recreation ; Presentation MEDIUM Complexity : Evolving with changing characteristics ;   Decision Making MEDIUM Complexity : FOTO score of 26-74; Overall Complexity LOW   Problem List: pain affecting function, decrease strength, decrease ADL/ functional abilitiies and decrease activity tolerance   Treatment Plan may include any combination of the following: Therapeutic exercise, Therapeutic activities, Neuromuscular re-education, Physical agent/modality, Manual therapy, Patient education and Functional mobility training  Patient / Family readiness to learn indicated by: asking questions, trying to perform skills and interest  Persons(s) to be included in education: patient (P)  Barriers to Learning/Limitations: no  Measures taken:    Patient Goal (s): Reduce pain, play normally   Patient self reported health status: excellent  Rehabilitation Potential: good   Short Term Goals: To be accomplished in  2  weeks:  1. Patient will demonstrate ability to perform BW squat through normal ROM and without pain in order to reduce pain with exercise activities. 2. Patient will report reduction in pain at worst to 5-6/10 in order to improve tolerance to recreational activities.  Long Term Goals: To be accomplished in  4  weeks:  1. Patient will improve FOTO to >/= 60/100 in order to improve quality of life. 2. Patient will report reduction in pain at worst to 2-3/10 in order to improve tolerance to athletic activities. 3. Patient will demonstrate ability to perform 10 repetitions of SL squat bilaterally with proper mechanics and no increase in pain in order to improve tolerance to exercise activities. Frequency / Duration:   Patient to be seen  2  times per week for 4  weeks:  Patient / Caregiver education and instruction: self care, activity modification and exercises  G-Codes (GP): opal  Therapist Signature: Xuan Rolon PT Date: 6/9/5080   Certification Period: na Time: 9:32 AM   ===========================================================================================  I certify that the above Physical Therapy Services are being furnished while the patient is under my care. I agree with the treatment plan and certify that this therapy is necessary. Physician Signature:        Date:       Time:     Please sign and return to [facility] or you may fax the signed copy to [phone]. Thank you.

## 2018-08-06 NOTE — TELEPHONE ENCOUNTER
Pt is calling requesting that she has a MRi done per [facility]. Would like a call so that she can schedule that also.

## 2018-08-06 NOTE — PROGRESS NOTES
PHYSICAL THERAPY - DAILY TREATMENT NOTE    Patient Name: Yarelis Escalante        Date: 2018  : 2000   YES Patient  Verified  Visit #:   1   of   4  Insurance: Payor: Xiomy Sam / Plan: [address] [place] [facility] / Product Type: Commerical /      In time: 840 Out time: 327   Total Treatment Time: 35     Medicare Time Tracking (below)   Total Timed Codes (min):  na 1:1 Treatment Time:  na     TREATMENT AREA =  Bilateral hip pain [M25.551, M25.552]  Bilateral knee pain [M25.561, M25.562]    SUBJECTIVE  Pain Level (on 0 to 10 scale):  6  / 10   Medication Changes/New allergies or changes in medical history, any new surgeries or procedures? NO    If yes, update Summary List   Subjective Functional Status/Changes:  []  No changes reported     See POC             min Patient Education:  YES  Reviewed HEP   []  Progressed/Changed HEP based on: Other Objective/Functional Measures:    See POC     Post Treatment Pain Level (on 0 to 10) scale:   6  / 10     ASSESSMENT  Assessment/Changes in Function:     See POC     []  See Progress Note/Recertification   Patient will continue to benefit from skilled PT services to modify and progress therapeutic interventions, address functional mobility deficits, address ROM deficits, address strength deficits, analyze and address soft tissue restrictions, analyze and cue movement patterns, analyze and modify body mechanics/ergonomics and assess and modify postural abnormalities to attain remaining goals.    Progress toward goals / Updated goals:    See POC     PLAN  [x]  Upgrade activities as tolerated YES Continue plan of care   []  Discharge due to :    []  Other:      Therapist: Abelino Lopez PT    Date: 2018 Time: 9:32 AM     Future Appointments  Date Time Provider Francisco Can   2018 5:00 PM Abelino Lopez PT [facility]   8/10/2018 8:00 AM Abelino Lopez PT [facility]   2018 10:30 [facility]

## 2018-08-07 ENCOUNTER — HOSPITAL ENCOUNTER (OUTPATIENT)
Dept: PHYSICAL THERAPY | Age: 18
Discharge: HOME | End: 2018-08-07
Payer: COMMERCIAL

## 2018-08-07 PROCEDURE — 97110 THERAPEUTIC EXERCISES: CPT

## 2018-08-07 PROCEDURE — 97140 MANUAL THERAPY 1/> REGIONS: CPT

## 2018-08-07 NOTE — PROGRESS NOTES
PHYSICAL THERAPY - DAILY TREATMENT NOTE    Patient Name: Aminta Ribera        Date: 2018  : 2000   YES Patient  Verified  Visit #:   2   of   4  Insurance: Payor: Reina Sloan / [address] / Product Type: Commerical /      In time: 453 Out time: 550   Total Treatment Time: 62     Medicare Time Tracking (below)   Total Timed Codes (min):  na 1:1 Treatment Time:  na     TREATMENT AREA =  Bilateral hip pain [M25.551, M25.552]  Bilateral knee pain [M25.561, M25.562]    SUBJECTIVE  Pain Level (on 0 to 10 scale):  6  / 10   Medication Changes/New allergies or changes in medical history, any new surgeries or procedures? NO    If yes, update Summary List   Subjective Functional Status/Changes:  []  No changes reported     Patient reports minimal changes in symptoms since her IE. Patient continues to experience R hip and B knee pain. OBJECTIVE  Modalities Rationale:     decrease inflammation and decrease pain to improve patient's ability to perform recreational activities. min [] Estim, type/location:                                      []  att     []  unatt     []  w/US     []  w/ice    []  w/heat    min []  Mechanical Traction: type/lbs                   []  pro   []  sup   []  int   []  cont    []  before manual    []  after manual    min []  Ultrasound, settings/location:      min []  Iontophoresis w/ dexamethasone, location:                                               []  take home patch       []  in clinic   10 min [x]  Ice     []  Heat    location/position: To R hip and B knees in long sit    min []  Vasopneumatic Device, press/temp:     min []  Other:    [x] Skin assessment post-treatment (if applicable):    [x]  intact    []  redness- no adverse reaction     []redness  adverse reaction:        32 min Therapeutic Exercise:  [x]  See flow sheet   Rationale:      increase ROM and increase strength to improve the patients ability to perform athletic activities.       15 min Manual Therapy: STM to R glute med, R piriformis, R TFL; STM to L VL and L VMO   Rationale:      decrease pain, increase ROM, increase tissue extensibility and decrease trigger points to improve patient's ability to perform running activities. min Patient Education:  YES  Reviewed HEP   []  Progressed/Changed HEP based on: Other Objective/Functional Measures:    Progressed program per flowsheet in order to improve hip and core strength/stability  Tenderness noted to R lateral hip musculature and L VL and VMO during MT  Patient challenged with anterior core stability this session     Post Treatment Pain Level (on 0 to 10) scale:   6 / 10     ASSESSMENT  Assessment/Changes in Function:     Patient denied changes in symptoms post session. Plan to progress program as appropriate NV in order to further address symptoms. []  See Progress Note/Recertification   Patient will continue to benefit from skilled PT services to modify and progress therapeutic interventions, address functional mobility deficits, address ROM deficits, address strength deficits, analyze and address soft tissue restrictions, analyze and cue movement patterns, analyze and modify body mechanics/ergonomics, assess and modify postural abnormalities and address imbalance/dizziness to attain remaining goals.    Progress toward goals / Updated goals:    No significant progress with STGs this session     PLAN  [x]  Upgrade activities as tolerated YES Continue plan of care   []  Discharge due to :    []  Other:      Therapist: Oscar Mcbride PT    Date: 8/7/2018 Time: 6:32 PM     Future Appointments  Date Time Provider Francisco Can   8/10/2018 8:00 AM Oscar Mcbride PT [facility]   8/13/2018 10:30 AM Oscar Mcbride PT [facility]

## 2018-08-10 ENCOUNTER — HOSPITAL ENCOUNTER (OUTPATIENT)
Dept: PHYSICAL THERAPY | Age: 18
Discharge: HOME | End: 2018-08-10
Payer: COMMERCIAL

## 2018-08-10 PROCEDURE — 97110 THERAPEUTIC EXERCISES: CPT

## 2018-08-10 PROCEDURE — 97140 MANUAL THERAPY 1/> REGIONS: CPT

## 2018-08-10 NOTE — PROGRESS NOTES
PHYSICAL THERAPY - DAILY TREATMENT NOTE    Patient Name: Bella Lake        Date: 8/10/2018  : 2000   YES Patient  Verified  Visit #:   3   of   4  Insurance: Payor: Noelle Paz / [address] / Product Type: Commerical /      In time: 758 Out time: 902   Total Treatment Time: 64     Medicare Time Tracking (below)   Total Timed Codes (min):  na 1:1 Treatment Time:  na     TREATMENT AREA =  Bilateral hip pain [M25.551, M25.552]  Bilateral knee pain [M25.561, M25.562]    SUBJECTIVE  Pain Level (on 0 to 10 scale):  5  / 10   Medication Changes/New allergies or changes in medical history, any new surgeries or procedures? NO    If yes, update Summary List   Subjective Functional Status/Changes:  []  No changes reported     Patient denies increased pain from her last visit, notes minor muscle soreness. OBJECTIVE  Modalities Rationale:     decrease inflammation and decrease pain to improve patient's ability to perform transfers. min [] Estim, type/location:                                      []  att     []  unatt     []  w/US     []  w/ice    []  w/heat    min []  Mechanical Traction: type/lbs                   []  pro   []  sup   []  int   []  cont    []  before manual    []  after manual    min []  Ultrasound, settings/location:      min []  Iontophoresis w/ dexamethasone, location:                                               []  take home patch       []  in clinic   10 min []  Ice     []  Heat    location/position: To R hip and L knee in long sit    min []  Vasopneumatic Device, press/temp:     min []  Other:    [x] Skin assessment post-treatment (if applicable):    [x]  intact    []  redness- no adverse reaction     []redness  adverse reaction:        39 min Therapeutic Exercise:  [x]  See flow sheet   Rationale:      increase ROM and increase strength to improve the patients ability to perform recreational activities.       15 min Manual Therapy: STM to R glute med, R TFL, R piriformis; STM to L VL and RF   Rationale:      decrease pain, increase ROM, increase tissue extensibility and decrease trigger points to improve patient's ability to perform walking activities. min Patient Education:  YES  Reviewed HEP   []  Progressed/Changed HEP based on: Other Objective/Functional Measures:    Required cuing during med ball reverse crunch and dead bug exercises on proper anterior core stability  Added SL clams with GTB for improved lateral hip strength  Continued tenderness to L VL and distal ITB during MT     Post Treatment Pain Level (on 0 to 10) scale:   4 / 10     ASSESSMENT  Assessment/Changes in Function:     Patient noted slight reduction in symptoms post session. Plan to transition patient to DC at [place] as she will be moving in to college. []  See Progress Note/Recertification   Patient will continue to benefit from skilled PT services to modify and progress therapeutic interventions, address functional mobility deficits, address ROM deficits, address strength deficits, analyze and address soft tissue restrictions, analyze and cue movement patterns, analyze and modify body mechanics/ergonomics and assess and modify postural abnormalities to attain remaining goals.    Progress toward goals / Updated goals:    Plan to transition to DC to HEP at [address] [facility]  [x]  Upgrade activities as tolerated YES Continue plan of care   []  Discharge due to :    []  Other:      Therapist: Divine Hoffman, PT    Date: 8/10/2018 Time: 9:24 AM     Future Appointments  Date Time Provider Francisco Can   8/13/2018 10:30 AM Divine Hoffman, PT [facility]

## 2018-08-13 ENCOUNTER — HOSPITAL ENCOUNTER (OUTPATIENT)
Dept: PHYSICAL THERAPY | Age: 18
Discharge: HOME | End: 2018-08-13
Payer: COMMERCIAL

## 2018-08-13 PROCEDURE — 97140 MANUAL THERAPY 1/> REGIONS: CPT

## 2018-08-13 PROCEDURE — 97110 THERAPEUTIC EXERCISES: CPT

## 2018-08-13 NOTE — PROGRESS NOTES
Griselda Tomlin [facility] PHYSICAL THERAPY  [address] [place] [address], [address] [facility], [place], [address] [place] [address] - Phone: [phone]  Fax: [phone] SUMMARY  Patient Name: Jimmy Walsh : 2000   Treatment/Medical Diagnosis: Bilateral hip pain [M25.551, M25.552]  Bilateral knee pain [M25.561, M25.562]   Referral Source: Estefania Guidry NP     Date of Initial Visit: 18 Attended Visits: 4 Missed Visits: 0     SUMMARY OF TREATMENT  Patient has attended 3 follow up visits since her initial evaluation on 18. Patient has received therapeutic exercise, manual therapy and modalities in order to improve R hip and B Knee strength, stability and pain reduction. CURRENT STATUS  Patient demonstrates minimal progress in regards to B hip and knee strength and stability since her IE. Patient will be moving in to her college on Wednesday and therefore will no longer be able to attend PT. Patient was provided copy of exercise program and instructed on independent use. Patient instructed that once her season is over, she should not play volleyball for 3-4 months and focus on strength and stability of her core and LE's. At this time, patient will be discharged from PT. Goal/Measure of Progress Goal Met? 1. Patient will improve FOTO to >/= 60/100 in order to improve quality of life   Status at last Eval: 47/100 Current Status: 47/100 no   2. Patient will report reduction in pain at worst to 2-3/10 in order to improve tolerance to athletic activities   Status at last Eval: 9/10 Current Status: 5-6/10 progressing   3.    Patient will demonstrate ability to perform 10 repetitions of SL squat bilaterally with proper mechanics and no increase in pain in order to improve tolerance to exercise activities   Status at last Eval: See IE Current Status: Unable to perform with proper mechanics at this time no     RECOMMENDATIONS  Other: Moving in to college    If you have any questions/comments please contact us directly at [phone]. Thank you for allowing us to assist in the care of your patient. Therapist Signature:  Oscar Mcbride PT Date: 8/13/18     Time: 12:44 PM

## 2018-08-13 NOTE — PROGRESS NOTES
PHYSICAL THERAPY - DAILY TREATMENT NOTE    Patient Name: Sean Myers        Date: 2018  : 2000   YES Patient  Verified  Visit #:   4   of   4  Insurance: Payor: Unknown Ades / Plan: [address] PT / Product Type: Commerical /      In time: 1026 Out time: 9572   Total Treatment Time: 60     Medicare Time Tracking (below)   Total Timed Codes (min):  na 1:1 Treatment Time:  na     TREATMENT AREA =  Bilateral hip pain [M25.551, M25.552]  Bilateral knee pain [M25.561, M25.562]    SUBJECTIVE  Pain Level (on 0 to 10 scale):  4  / 10   Medication Changes/New allergies or changes in medical history, any new surgeries or procedures? NO    If yes, update Summary List   Subjective Functional Status/Changes:  []  No changes reported     Patient reports minimal changes in symptoms post session. Patient states she is moving in to college on Wednesday and will be unable to continue with PT.          OBJECTIVE  Modalities Rationale:     decrease inflammation and decrease pain to improve patient's ability to perform household activities.     min [] Estim, type/location:                                      []  att     []  unatt     []  w/US     []  w/ice    []  w/heat    min []  Mechanical Traction: type/lbs                   []  pro   []  sup   []  int   []  cont    []  before manual    []  after manual    min []  Ultrasound, settings/location:      min []  Iontophoresis w/ dexamethasone, location:                                               []  take home patch       []  in clinic   10 min [x]  Ice     []  Heat    location/position: To R hip and L knee in long sit    min []  Vasopneumatic Device, press/temp:     min []  Other:    [x] Skin assessment post-treatment (if applicable):    [x]  intact    []  redness- no adverse reaction     []redness  adverse reaction:        35 min Therapeutic Exercise:  [x]  See flow sheet   Rationale:      increase ROM and increase strength to improve the patients ability to perform athletic activities. 15 min Manual Therapy: STM to L VL, L RF; STM to R glute med, R piriformis, R TFL   Rationale:      decrease pain, increase ROM, increase tissue extensibility and decrease trigger points to improve patient's ability to perform walking activities. min Patient Education:  YES  Reviewed HEP   []  Progressed/Changed HEP based on: Other Objective/Functional Measures:    See DC note     Post Treatment Pain Level (on 0 to 10) scale:   4  / 10     ASSESSMENT           Progress toward goals / Updated goals:    See DC note     PLAN  []  Upgrade activities as tolerated NO Continue plan of care   [x]  Discharge due to : Moving out of town   []  Other:      Therapist: Jeannette Andrews, PT    Date: 8/13/2018 Time: 12:49 PM     No future appointments.

## 2022-02-01 NOTE — MR AVS SNAPSHOT
[address] [place] [address] [address] [facility] [address] [phone] Patient: Akanksha Garcia MRN: [id number] :2000 Visit Information Date & Time Provider Department Dept. Phone Encounter #  
 2018  2:00 PM Julian Araujo, [facility] [phone] [id number] Follow-up Instructions Return if symptoms worsen or fail to improve. Upcoming Health Maintenance Date Due Hepatitis A Peds Age 1-18 (1 of 2 - Standard Series) 11/10/1999 DTaP/Tdap/Td series (1 - Tdap) 11/10/2005 HPV Age 9Y-34Y (1 of 3 - Female 3 Dose Series) 11/10/2009 Influenza Age 5 to Adult 2018 Allergies as of 2018  Review Complete On: 2018 By: Julian Araujo NP Severity Noted Reaction Type Reactions Nuts [Tree Nut] High 2018    Anaphylaxis Melon  2018    Itching Current Immunizations  Never Reviewed No immunizations on file. Not reviewed this visit You Were Diagnosed With   
  
 Codes Comments Chronic pain of left knee    -  Primary ICD-10-CM: M25.562, U01.37 ICD-9-CM: 719.46, 338.29 Vitals BP Pulse Temp Resp Height(growth percentile) Weight(growth percentile) 107/70 (31 %/ 67 %)* (BP 1 Location: Left arm, BP Patient Position: Sitting) 94 97.8 °F (36.6 °C) (Oral) 18 5' 10\" (1.778 m) (99 %, Z= 2.25) 169 lb (76.7 kg) (91 %, Z= 1.36) LMP SpO2 BMI OB Status Smoking Status 2018 97% 24.25 kg/m2 (74 %, Z= 0.65) Having regular periods Never Smoker *BP percentiles are based on NHBPEP's 4th Report Growth percentiles are based on CDC 2-20 Years data. BMI and BSA Data Body Mass Index Body Surface Area  
 24.25 kg/m 2 1.95 m 2 Preferred Pharmacy Pharmacy Name Phone [facility] [phone] Your Updated Medication List  
 Patient received Vidaza injection SQ to ABD x 2.  Tolerated well.  Vitals stable.  No apparent distress noted.  Ambulated off unit with steady gait.      This list is accurate as of 7/30/18  2:49 PM.  Always use your most recent med list.  
  
  
  
  
 * ACANYA 1.2-2.5 % Glwp Generic drug:  clindamycin-benzoyl peroxide Acanya 1.2 %-2.5 % topical gel with pump * clindamycin-benzoyl peroxide 1.2-2.5 % Glwp APPLY A PEA SIZED AMOUNT TO ENTIRE FACE IN THE MORNING AS DIRECTED * clindamycin-benzoyl peroxide 1-5 % topical gel Commonly known as:  Danisha Kirkland 1/20 (21) 1-20 mg-mcg Tab Generic drug:  norethindrone-ethinyl estradiol Take  by mouth.  
  
 sulfacetamide sodium 10 % topical cream  
Apply  to affected area two (2) times a day. tazorotene 0.05 % topical gel Commonly known as:  Argelia Gray Apply  to affected area nightly. use thin film * Notice: This list has 3 medication(s) that are the same as other medications prescribed for you. Read the directions carefully, and ask your doctor or other care provider to review them with you. Follow-up Instructions Return if symptoms worsen or fail to improve. Patient Instructions Knee Pain or Injury: Care Instructions Your Care Instructions Injuries are a common cause of knee problems. Sudden (acute) injuries may be caused by a direct blow to the knee. They can also be caused by abnormal twisting, bending, or falling on the knee. Pain, bruising, or swelling may be severe, and may start within minutes of the injury. Overuse is another cause of knee pain. Other causes are climbing stairs, kneeling, and other activities that use the knee. Everyday wear and tear, especially as you get older, also can cause knee pain. Rest, along with home treatment, often relieves pain and allows your knee to heal. If you have a serious knee injury, you may need tests and treatment. Follow-up care is a key part of your treatment and safety.  Be sure to make and go to all appointments, and call your doctor if you are having problems. It's also a good idea to know your test results and keep a list of the medicines you take. How can you care for yourself at home? · Be safe with medicines. Read and follow all instructions on the label. ¨ If the doctor gave you a prescription medicine for pain, take it as prescribed. ¨ If you are not taking a prescription pain medicine, ask your doctor if you can take an over-the-counter medicine. · Rest and protect your knee. Take a break from any activity that may cause pain. · Put ice or a cold pack on your knee for 10 to 20 minutes at a time. Put a thin cloth between the ice and your skin. · Prop up a sore knee on a pillow when you ice it or anytime you sit or lie down for the next 3 days. Try to keep it above the level of your heart. This will help reduce swelling. · If your knee is not swollen, you can put moist heat, a heating pad, or a warm cloth on your knee. · If your doctor recommends an elastic bandage, sleeve, or other type of support for your knee, wear it as directed. · Follow your doctor's instructions about how much weight you can put on your leg. Use a cane, crutches, or a walker as instructed. · Follow your doctor's instructions about activity during your healing process. If you can do mild exercise, slowly increase your activity. · Reach and stay at a healthy weight. Extra weight can strain the joints, especially the knees and hips, and make the pain worse. Losing even a few pounds may help. When should you call for help? Call 911 anytime you think you may need emergency care. For example, call if: 
  · You have symptoms of a blood clot in your lung (called a pulmonary embolism). These may include: 
¨ Sudden chest pain. ¨ Trouble breathing. ¨ Coughing up blood.  
 Call your doctor now or seek immediate medical care if: 
  · You have severe or increasing pain.  
  · Your leg or foot turns cold or changes color.  
  · You cannot stand or put weight on your knee.   · Your knee looks twisted or bent out of shape.  
  · You cannot move your knee.  
  · You have signs of infection, such as: 
¨ Increased pain, swelling, warmth, or redness. ¨ Red streaks leading from the knee. ¨ Pus draining from a place on your knee. ¨ A fever.  
  · You have signs of a blood clot in your leg (called a deep vein thrombosis), such as: 
¨ Pain in your calf, back of the knee, thigh, or groin. ¨ Redness and swelling in your leg or groin.  
 Watch closely for changes in your health, and be sure to contact your doctor if: 
  · You have tingling, weakness, or numbness in your knee.  
  · You have any new symptoms, such as swelling.  
  · You have bruises from a knee injury that last longer than 2 weeks.  
  · You do not get better as expected. Where can you learn more? Go to http://olga-ana.info/. Enter K195 in the search box to learn more about \"Knee Pain or Injury: Care Instructions. \" Current as of: November 20, 2017 Content Version: 11.7 © 5046-3677 [employer]. Care instructions adapted under license by [employer] (which disclaims liability or warranty for this information). If you have questions about a medical condition or this instruction, always ask your healthcare professional. [address] any warranty or liability for your use of this information. Introducing [facility]! Zoie Shannon introduces [employer] patient portal. Now you can access parts of your medical record, email your doctor's office, and request medication refills online. 1. In your internet browser, go to [email or web address]. [email or web address]/[email or web address] 2. Click on the First Time User? Click Here link in the Sign In box. You will see the New Member Sign Up page. 3. Enter your [employer] Access Code exactly as it appears below. You will not need to use this code after youve completed the sign-up process.  If you do not sign up before the expiration date, you must request a new code. · [employer] Access Code: [email or web address] Expires: 8/5/2018  2:15 PM 
 
4. Enter the last four digits of your Social Security Number (xxxx) and Date of Birth (mm/dd/yyyy) as indicated and click Submit. You will be taken to the next sign-up page. 5. Create a [employer] ID. This will be your [employer] login ID and cannot be changed, so think of one that is secure and easy to remember. 6. Create a [employer] password. You can change your password at any time. 7. Enter your Password Reset Question and Answer. This can be used at a later time if you forget your password. 8. Enter your e-mail address. You will receive e-mail notification when new information is available in [address]. 9. Click Sign Up. You can now view and download portions of your medical record. 10. Click the Download Summary menu link to download a portable copy of your medical information. If you have questions, please visit the Frequently Asked Questions section of the [employer] website. Remember, [employer] is NOT to be used for urgent needs. For medical emergencies, dial 911. Now available from your iPhone and Android! Please provide this summary of care documentation to your next provider. Your primary care clinician is listed as PROVIDER UNKNOWN. If you have any questions after today's visit, please call [phone].

## 2022-07-25 NOTE — TELEPHONE ENCOUNTER
Spoke to patient she states her  wants her to schedule her MRI now for around Thanksgiving. Informed patient that I would send message to Dr. Deirdre Morillo. Also informed her that if we do order the MRI now the only problem is that if they do get the auth now it might not be good till November. Please advise. Her/She

## 2023-11-10 NOTE — PROGRESS NOTES
Efrem Rashid is a 21 y.o.  female and presents with    Chief Complaint   Patient presents with    Knee Pain     Left sided; x1 year      Subjective:  Patient presents to establish care she has c/o left knee pain. She plays volleyball for [employer] and would like to be able to play for the next 4 years. She was told in past that she has tendinitis of the left knee. She has also had tendinitis of right knee she did PT and had good resolution of symptoms. She states today that she has been doing light PT with the  at the [facility] but since she is not improving they recommended she follow up with PCP and consider options for her continued concerns. Current Outpatient Prescriptions   Medication Sig Dispense Refill    clindamycin-benzoyl peroxide (ACANYA) 1.2-2.5 % glwp Acanya 1.2 %-2.5 % topical gel with pump      clindamycin-benzoyl peroxide 1.2-2.5 % glwp APPLY A PEA SIZED AMOUNT TO ENTIRE FACE IN THE MORNING AS DIRECTED      norethindrone-ethinyl estradiol (JUNEL 1/20, 21,) 1-20 mg-mcg tab Take  by mouth.  tazorotene (TAZORAC) 0.05 % topical gel Apply  to affected area nightly. use thin film      sulfacetamide sodium 10 % topical cream Apply  to affected area two (2) times a day.  clindamycin-benzoyl peroxide (BENZACLIN) 1-5 % topical gel        Allergies   Allergen Reactions    Nuts [Tree Nut] Anaphylaxis    Melon Itching       Review of Systems   Musculoskeletal:        + knee pain when playing volleyball, achy when walking   All other systems reviewed and are negative. Objective:  Vitals:    05/01/18 1135   BP: 116/80   Pulse: 76   Resp: 17   Temp: 98.1 °F (36.7 °C)   TempSrc: Oral   SpO2: 98%   Weight: 172 lb (78 kg)   Height: 5' 10\" (1.778 m)   PainSc:   0 - No pain   LMP: 04/22/2018     General:   Well-groomed, well-nourished, in no distress, pleasant, alert, appropriate     Knee:   Normal full range of motion of joints, 5/5 muscle strength throughout.   Flexion / extension intact,   Tender to deep palpation lateral left side of knee. Assessment/Plan:      1. Acute pain of left knee  Will refer to sports medicine goal is to play volleyball for next 4 years in [place]. She would like interventions that allow this. Discussed best option is have Sports Medicine onboard for this goal.   - XR KNEE LT MIN 4 V; Future  - [facility]    2. Tendinitis of left knee  Previous dx could be ongoing or could be new injury, discussed this with patient.   - [facility] 27 [facility]    I have discussed the diagnosis with the patient and the intended plan as seen in the above orders. The patient has received an after-visit summary and questions were answered concerning future plans. I have discussed medication side effects and warnings with the patient as well. I have reviewed the plan of care with the patient, accepted their input and they are in agreement with the treatment goals. Follow-up Disposition:  Return if symptoms worsen or fail to improve. More than 1/2 of this 30 minute visit was spent in counselling and coordination of care, as described above.     Linnette CAMACHOP-BC No